# Patient Record
Sex: FEMALE | Race: BLACK OR AFRICAN AMERICAN | NOT HISPANIC OR LATINO | Employment: OTHER | URBAN - METROPOLITAN AREA
[De-identification: names, ages, dates, MRNs, and addresses within clinical notes are randomized per-mention and may not be internally consistent; named-entity substitution may affect disease eponyms.]

---

## 2024-09-05 ENCOUNTER — OFFICE VISIT (OUTPATIENT)
Dept: FAMILY MEDICINE | Facility: CLINIC | Age: 24
End: 2024-09-05
Payer: MEDICAID

## 2024-09-05 VITALS
BODY MASS INDEX: 33.64 KG/M2 | HEART RATE: 83 BPM | DIASTOLIC BLOOD PRESSURE: 86 MMHG | TEMPERATURE: 97.4 F | SYSTOLIC BLOOD PRESSURE: 126 MMHG | HEIGHT: 62 IN | WEIGHT: 182.8 LBS | OXYGEN SATURATION: 99 % | RESPIRATION RATE: 18 BRPM

## 2024-09-05 DIAGNOSIS — Z11.4 SCREENING FOR HIV (HUMAN IMMUNODEFICIENCY VIRUS): ICD-10-CM

## 2024-09-05 DIAGNOSIS — Z02.89 REFUGEE HEALTH EXAMINATION: Primary | ICD-10-CM

## 2024-09-05 DIAGNOSIS — Z11.59 NEED FOR HEPATITIS C SCREENING TEST: ICD-10-CM

## 2024-09-05 LAB
BASOPHILS # BLD AUTO: 0.1 10E3/UL (ref 0–0.2)
BASOPHILS NFR BLD AUTO: 1 %
EOSINOPHIL # BLD AUTO: 0.5 10E3/UL (ref 0–0.7)
EOSINOPHIL NFR BLD AUTO: 6 %
ERYTHROCYTE [DISTWIDTH] IN BLOOD BY AUTOMATED COUNT: 13.7 % (ref 10–15)
HCT VFR BLD AUTO: 41.9 % (ref 35–47)
HGB BLD-MCNC: 14 G/DL (ref 11.7–15.7)
IMM GRANULOCYTES # BLD: 0 10E3/UL
IMM GRANULOCYTES NFR BLD: 0 %
LYMPHOCYTES # BLD AUTO: 2.7 10E3/UL (ref 0.8–5.3)
LYMPHOCYTES NFR BLD AUTO: 33 %
MCH RBC QN AUTO: 26.6 PG (ref 26.5–33)
MCHC RBC AUTO-ENTMCNC: 33.4 G/DL (ref 31.5–36.5)
MCV RBC AUTO: 80 FL (ref 78–100)
MONOCYTES # BLD AUTO: 0.5 10E3/UL (ref 0–1.3)
MONOCYTES NFR BLD AUTO: 6 %
NEUTROPHILS # BLD AUTO: 4.3 10E3/UL (ref 1.6–8.3)
NEUTROPHILS NFR BLD AUTO: 53 %
NRBC # BLD AUTO: 0 10E3/UL
NRBC BLD AUTO-RTO: 0 /100
PLATELET # BLD AUTO: 323 10E3/UL (ref 150–450)
RBC # BLD AUTO: 5.26 10E6/UL (ref 3.8–5.2)
WBC # BLD AUTO: 8.2 10E3/UL (ref 4–11)

## 2024-09-05 PROCEDURE — 80053 COMPREHEN METABOLIC PANEL: CPT | Performed by: FAMILY MEDICINE

## 2024-09-05 PROCEDURE — 87591 N.GONORRHOEAE DNA AMP PROB: CPT | Performed by: FAMILY MEDICINE

## 2024-09-05 PROCEDURE — 87389 HIV-1 AG W/HIV-1&-2 AB AG IA: CPT | Performed by: FAMILY MEDICINE

## 2024-09-05 PROCEDURE — 86803 HEPATITIS C AB TEST: CPT | Performed by: FAMILY MEDICINE

## 2024-09-05 PROCEDURE — 86682 HELMINTH ANTIBODY: CPT | Mod: 90 | Performed by: FAMILY MEDICINE

## 2024-09-05 PROCEDURE — 36415 COLL VENOUS BLD VENIPUNCTURE: CPT | Performed by: FAMILY MEDICINE

## 2024-09-05 PROCEDURE — 86787 VARICELLA-ZOSTER ANTIBODY: CPT | Performed by: FAMILY MEDICINE

## 2024-09-05 PROCEDURE — 99385 PREV VISIT NEW AGE 18-39: CPT | Performed by: FAMILY MEDICINE

## 2024-09-05 PROCEDURE — 86481 TB AG RESPONSE T-CELL SUSP: CPT | Performed by: FAMILY MEDICINE

## 2024-09-05 PROCEDURE — 99000 SPECIMEN HANDLING OFFICE-LAB: CPT | Performed by: FAMILY MEDICINE

## 2024-09-05 PROCEDURE — 87491 CHLMYD TRACH DNA AMP PROBE: CPT | Performed by: FAMILY MEDICINE

## 2024-09-05 PROCEDURE — 85025 COMPLETE CBC W/AUTO DIFF WBC: CPT | Performed by: FAMILY MEDICINE

## 2024-09-05 NOTE — PROGRESS NOTES
New Patient Note-Refugee     HPI     Concerns today: No special concerns today.  An [employer]  was used for  this visit.     There is no problem list on file for this patient.      No past medical history on file.    Immunization History   Administered Date(s) Administered    HepB, Unspecified 12/05/2023, 01/09/2024, 06/19/2024    MMR 11/01/2023, 12/05/2023    Td (Adult), Adsorbed 12/05/2023, 01/09/2024     [facility] Refugee Immunization Guidelines     No family history on file.       Review of Systems     Review of Systems:  CONSTITUTIONAL: NEGATIVE for fever, chills, change in weight  INTEGUMENTARY/SKIN: NEGATIVE for worrisome rashes, moles or lesions  EYES: NEGATIVE for vision changes or irritation  ENT/MOUTH: NEGATIVE for ear, mouth and throat problems  RESP: NEGATIVE for significant cough or SOB  BREAST: NEGATIVE for masses, tenderness or discharge  CV: NEGATIVE for chest pain, palpitations or peripheral edema  GI: NEGATIVE for nausea, abdominal pain, heartburn, or change in bowel habits  : NEGATIVE for frequency, dysuria, or hematuria  MUSCULOSKELETAL: NEGATIVE for significant arthralgias or myalgia  NEURO: NEGATIVE for weakness, dizziness or paresthesias  ENDOCRINE: NEGATIVE for temperature intolerance, skin/hair changes  HEME/ALLERGY: NEGATIVE for bleeding problems  PSYCHIATRIC: NEGATIVE for changes in mood or affect       Social History     Social History     Tobacco Use    Smoking status: Never     Passive exposure: Never    Smokeless tobacco: Never   Substance Use Topics    Alcohol use: Not on file       Marital Status:Single  Who lives in your household? daughter    Country of Origin: [place]    Were you the victim of violence in your home country? No    If YES, offer the following and complete Refugee Mental Health Screen below:      Refugee Mental Health Screen:  [facility] Mental Health Screening Recommendations  (If any positive results recommend further evaluation)  1. In the past month, have you had  "many bad dreams or nightmares that remind you of things that happened in your country or refugee camp? No    2. In the past month, have you felt very sad? Yes    3. In the past month, have you been thinking too much about the past (even if you did not want to)? No    4. In the past month, have you avoided situations that remind you of the past? No  Prompt: Do you turn off the radio or TV if the programming is disturbing    5. Do any of these problems make it difficult to do what you need to do on a daily basis? No  Prompt: Are you able to take care of yourself and your family?         Sexual Health     Sexual concerns: none  STI History: Neg    For female patients:   Currently pregnant: No   Pregnancy History: No obstetric history on file.   LMP Patient's last menstrual period was 09/02/2024 (exact date).    Last Pap Smear Date: No results found for: \"PAP\"   Abnormal Pap History: None     Physical Exam     Vitals: /86 (BP Location: Right arm, Patient Position: Chair, Cuff Size: Adult Regular)   Pulse 83   Temp 97.4  F (36.3  C) (Temporal)   Resp 18   Ht 1.562 m (5' 1.5\")   Wt 82.9 kg (182 lb 12.8 oz)   LMP 09/02/2024 (Exact Date)   SpO2 99%   BMI 33.98 kg/m    BMI= Body mass index is 33.98 kg/m .     GENERAL: healthy, alert, well nourished, well hydrated, no distress  HENT: ear canals- normal; TMs- normal; Nose- normal; Mouth- no ulcers, no lesions  NECK: no tenderness, no adenopathy, no asymmetry, no masses, no stiffness; thyroid- normal to palpation  RESP: lungs clear to auscultation - no rales, no rhonchi, no wheezes  CV: regular rates and rhythm, normal S1 S2, no S3 or S4 and no murmur, no click or rub -  ABDOMEN: soft, no tenderness, no  hepatosplenomegaly, no masses, normal bowel sounds  - female: circumcision grade 1 - Cat Barclay PAC         Assessment and Plan      Sejal was seen today for refugee assessment.    Diagnoses and all orders for this visit:    Refugee health examination  -     " Quantiferon TB Gold Plus; Future  -     Comprehensive metabolic panel (BMP + Alb, Alk Phos, ALT, AST, Total. Bili, TP); Future  -     CBC with platelets and differential; Future  -     Strongyloides antibody IgG; Future  -     Schistosoma Antibody IgG; Future  -     Varicella Zoster Virus Antibody IgG; Future  -     Neisseria gonorrhoeae PCR; Future  -     Chlamydia trachomatis PCR; Future  -     Quantiferon TB Gold Plus  -     Comprehensive metabolic panel (BMP + Alb, Alk Phos, ALT, AST, Total. Bili, TP)  -     CBC with platelets and differential  -     Strongyloides antibody IgG  -     Schistosoma Antibody IgG  -     Varicella Zoster Virus Antibody IgG  -     Neisseria gonorrhoeae PCR  -     Chlamydia trachomatis PCR  -     Hepatitis B Surface Antibody; Future  -     Hepatitis B core antibody; Future  -     Hepatitis B surface antigen; Future  -     Hepatitis A antibody IgM; Future  -     Varicella Zoster Virus Antibody IgG; Future    Screening for HIV (human immunodeficiency virus)  -     HIV Antigen Antibody Combo; Future  -     HIV Antigen Antibody Combo    Need for hepatitis C screening test  -     Hepatitis C Screen Reflex to HCV RNA Quant and Genotype; Future  -     Hepatitis C Screen Reflex to HCV RNA Quant and Genotype        Options for treatment and follow-up care were reviewed with the patient . Sejal Ortega  engaged in the decision making process and verbalized understanding of the options discussed and agreed with the final plan.    Follow up 2 weeks    FELIPE ESCOBEDO DO

## 2024-09-06 LAB
ALBUMIN SERPL BCG-MCNC: 4.4 G/DL (ref 3.5–5.2)
ALP SERPL-CCNC: 102 U/L (ref 40–150)
ALT SERPL W P-5'-P-CCNC: 17 U/L (ref 0–50)
ANION GAP SERPL CALCULATED.3IONS-SCNC: 11 MMOL/L (ref 7–15)
AST SERPL W P-5'-P-CCNC: 21 U/L (ref 0–45)
BILIRUB SERPL-MCNC: 0.5 MG/DL
BUN SERPL-MCNC: 10.7 MG/DL (ref 6–20)
C TRACH DNA SPEC QL NAA+PROBE: NEGATIVE
CALCIUM SERPL-MCNC: 9.4 MG/DL (ref 8.8–10.4)
CHLORIDE SERPL-SCNC: 103 MMOL/L (ref 98–107)
CREAT SERPL-MCNC: 0.64 MG/DL (ref 0.51–0.95)
EGFRCR SERPLBLD CKD-EPI 2021: >90 ML/MIN/1.73M2
GLUCOSE SERPL-MCNC: 94 MG/DL (ref 70–99)
HCO3 SERPL-SCNC: 22 MMOL/L (ref 22–29)
HCV AB SERPL QL IA: NONREACTIVE
HIV 1+2 AB+HIV1 P24 AG SERPL QL IA: NONREACTIVE
N GONORRHOEA DNA SPEC QL NAA+PROBE: NEGATIVE
POTASSIUM SERPL-SCNC: 4.2 MMOL/L (ref 3.4–5.3)
PROT SERPL-MCNC: 7.3 G/DL (ref 6.4–8.3)
SODIUM SERPL-SCNC: 136 MMOL/L (ref 135–145)
VZV IGG SER QL IA: 786.4 INDEX
VZV IGG SER QL IA: POSITIVE

## 2024-09-07 LAB
GAMMA INTERFERON BACKGROUND BLD IA-ACNC: 0.04 IU/ML
M TB IFN-G BLD-IMP: NEGATIVE
M TB IFN-G CD4+ BCKGRND COR BLD-ACNC: 9.96 IU/ML
MITOGEN IGNF BCKGRD COR BLD-ACNC: 0 IU/ML
MITOGEN IGNF BCKGRD COR BLD-ACNC: 0 IU/ML
QUANTIFERON MITOGEN: 10 IU/ML
QUANTIFERON NIL TUBE: 0.04 IU/ML
QUANTIFERON TB1 TUBE: 0.04 IU/ML
QUANTIFERON TB2 TUBE: 0.04

## 2024-09-08 LAB
SCHISTOSOMA IGG SER IA-ACNC: 59 U
STRONGYLOIDES IGG SER IA-ACNC: 0.1 IV

## 2024-09-19 ENCOUNTER — OFFICE VISIT (OUTPATIENT)
Dept: FAMILY MEDICINE | Facility: CLINIC | Age: 24
End: 2024-09-19
Payer: MEDICAID

## 2024-09-19 VITALS
DIASTOLIC BLOOD PRESSURE: 71 MMHG | HEIGHT: 62 IN | OXYGEN SATURATION: 100 % | BODY MASS INDEX: 33.86 KG/M2 | HEART RATE: 82 BPM | SYSTOLIC BLOOD PRESSURE: 103 MMHG | WEIGHT: 184 LBS | RESPIRATION RATE: 16 BRPM | TEMPERATURE: 97.7 F

## 2024-09-19 DIAGNOSIS — Z02.89 REFUGEE HEALTH EXAMINATION: Primary | ICD-10-CM

## 2024-09-19 PROCEDURE — 99213 OFFICE O/P EST LOW 20 MIN: CPT | Performed by: FAMILY MEDICINE

## 2024-09-19 NOTE — PROGRESS NOTES
"  Assessment & Plan   Problem List Items Addressed This Visit    None  Visit Diagnoses       Refugee health examination    -  Primary           No positive findings, will submit paperwork to the immigration department  Reviewed findings with   Follow up PRN     BMI  Estimated body mass index is 34.2 kg/m  as calculated from the following:    Height as of this encounter: 1.562 m (5' 1.5\").    Weight as of this encounter: 83.5 kg (184 lb).             Manuel Post is a 23 year old, presenting for the following health issues:  Refgee Assessment (Visit #2)      9/19/2024    10:55 AM   Additional Questions   Roomed by Joyce FAY CMA         9/19/2024   Forms   Any forms needing to be completed Yes        HPI     Passed hearing/vision      Objective    /71 (BP Location: Right arm, Patient Position: Chair, Cuff Size: Adult Large)   Pulse 82   Temp 97.7  F (36.5  C) (Temporal)   Resp 16   Ht 1.562 m (5' 1.5\")   Wt 83.5 kg (184 lb)   LMP 09/02/2024 (Exact Date)   SpO2 100%   BMI 34.20 kg/m    Body mass index is 34.2 kg/m .  Physical Exam   Gen NAD      Office Visit on 09/05/2024   Component Date Value Ref Range Status    HIV Antigen Antibody Combo 09/05/2024 Nonreactive  Nonreactive Final    Negative HIV-1 p24 antigen and HIV-1/2 antibody screening test results usually indicate the absence of HIV-1 and HIV-2 infection. However, such negative results do not rule-out acute HIV infection.  If acute HIV-1 or HIV-2 infection is suspected, detection of HIV-1 or HIV-2 RNA  is recommended.     Hepatitis C Antibody 09/05/2024 Nonreactive  Nonreactive Final    A nonreactive screening test result does not exclude the possibility of exposure to or infection with HCV. Nonreactive screening test results in individuals with prior exposure to HCV may be due to antibody levels below the limit of detection of this assay or lack of reactivity to the HCV antigens used in this assay. Patients with recent HCV " infections (<3 months from time of exposure) may have false-negative HCV antibody results due to the time needed for seroconversion (average of 8 to 9 weeks).    Sodium 09/05/2024 136  135 - 145 mmol/L Final    Potassium 09/05/2024 4.2  3.4 - 5.3 mmol/L Final    Carbon Dioxide (CO2) 09/05/2024 22  22 - 29 mmol/L Final    Anion Gap 09/05/2024 11  7 - 15 mmol/L Final    Urea Nitrogen 09/05/2024 10.7  6.0 - 20.0 mg/dL Final    Creatinine 09/05/2024 0.64  0.51 - 0.95 mg/dL Final    GFR Estimate 09/05/2024 >90  >60 mL/min/1.73m2 Final    eGFR calculated using 2021 CKD-EPI equation.    Calcium 09/05/2024 9.4  8.8 - 10.4 mg/dL Final    Reference intervals for this test were updated on 7/16/2024 to reflect our healthy population more accurately. There may be differences in the flagging of prior results with similar values performed with this method. Those prior results can be interpreted in the context of the updated reference intervals.    Chloride 09/05/2024 103  98 - 107 mmol/L Final    Glucose 09/05/2024 94  70 - 99 mg/dL Final    Alkaline Phosphatase 09/05/2024 102  40 - 150 U/L Final    AST 09/05/2024 21  0 - 45 U/L Final    ALT 09/05/2024 17  0 - 50 U/L Final    Protein Total 09/05/2024 7.3  6.4 - 8.3 g/dL Final    Albumin 09/05/2024 4.4  3.5 - 5.2 g/dL Final    Bilirubin Total 09/05/2024 0.5  <=1.2 mg/dL Final    Strongyloides Lauren IgG 09/05/2024 0.1  <=0.9 IV Final    Comment: INTERPRETIVE INFORMATION: Strongyloides Ab, IgG by [facility]ERLEY      0.9 IV or less....... Negative - No significant                          level of Strongyloides IgG                          antibody detected.       1.0 IV................Equivocal - The Strongyloides IgG                           antibody result is borderline and                           therefore inconclusive. Recommend                           retesting the patient in 2-4 weeks,                          if clinically indicated.      1.1 IV or greater ... Positive - IgG  antibodies to                          Strongyloides detected, which                          may suggest current or past                          infection.    False-positive results may occur with prior exposure to   other helminth infections. Testing low-prevalence   populations may also result in false-positive results.  Performed By: [facility]  [address] [place] [address]  : Cisco Beck MD, PhD  CLIA                            Number: [id number]    Schistosoma Antibody IgG 09/05/2024 59 (H)  <=8 U Final      Positive - IgG antibodies to Schistosoma detected, which   may suggest current or past infection.   This test cannot distinguish between current or resolved,   past infection. Serologic cross-reactivity can occur in   individuals with other helminth infections. When serum   antibodies are positive, the presence of Schistosoma ova in   stool or urine should be evaluated by microscopy. This test   should not be used to monitor for cure because patients   remain seropositive after successful treatment and/or   infection clearance.  Performed By: [facility]  [address] [place] [id number]  : Cisco Beck MD, PhD  CLIA Number: [id number]    VZV Lauren IgG Instrument Value 09/05/2024 786.4  <135.0 Index Final    Varicella Zoster Antibody IgG 09/05/2024 Positive   Final    Suggests previous exposure or immunization and probable immunity.    Neisseria gonorrhoeae 09/05/2024 Negative  Negative Final    Negative for N. gonorrhoeae rRNA by transcription mediated amplification. A negative result by transcription mediated amplification does not preclude the presence of C. trachomatis infection because results are dependent on proper and adequate collection, absence of inhibitors and sufficient rRNA to be detected.    Chlamydia trachomatis 09/05/2024 Negative  Negative Final    A negative result by transcription mediated  amplification does not preclude the presence of C. trachomatis infection because results are dependent on proper and adequate collection, absence of inhibitors and sufficient rRNA to be detected.    Quantiferon Nil Tube 09/05/2024 0.04  IU/mL Final    Quantiferon TB1 Tube 09/05/2024 0.04  IU/mL Final    Quantiferon TB2 Tube 09/05/2024 0.04   Final    Quantiferon Mitogen 09/05/2024 10.00  IU/mL Final    WBC Count 09/05/2024 8.2  4.0 - 11.0 10e3/uL Final    RBC Count 09/05/2024 5.26 (H)  3.80 - 5.20 10e6/uL Final    Hemoglobin 09/05/2024 14.0  11.7 - 15.7 g/dL Final    Hematocrit 09/05/2024 41.9  35.0 - 47.0 % Final    MCV 09/05/2024 80  78 - 100 fL Final    MCH 09/05/2024 26.6  26.5 - 33.0 pg Final    MCHC 09/05/2024 33.4  31.5 - 36.5 g/dL Final    RDW 09/05/2024 13.7  10.0 - 15.0 % Final    Platelet Count 09/05/2024 323  150 - 450 10e3/uL Final    % Neutrophils 09/05/2024 53  % Final    % Lymphocytes 09/05/2024 33  % Final    % Monocytes 09/05/2024 6  % Final    % Eosinophils 09/05/2024 6  % Final    % Basophils 09/05/2024 1  % Final    % Immature Granulocytes 09/05/2024 0  % Final    NRBCs per 100 WBC 09/05/2024 0  <1 /100 Final    Absolute Neutrophils 09/05/2024 4.3  1.6 - 8.3 10e3/uL Final    Absolute Lymphocytes 09/05/2024 2.7  0.8 - 5.3 10e3/uL Final    Absolute Monocytes 09/05/2024 0.5  0.0 - 1.3 10e3/uL Final    Absolute Eosinophils 09/05/2024 0.5  0.0 - 0.7 10e3/uL Final    Absolute Basophils 09/05/2024 0.1  0.0 - 0.2 10e3/uL Final    Absolute Immature Granulocytes 09/05/2024 0.0  <=0.4 10e3/uL Final    Absolute NRBCs 09/05/2024 0.0  10e3/uL Final    Quantiferon-TB Gold Plus 09/05/2024 Negative  Negative Final    No interferon gamma response to M.tuberculosis antigens was detected. Infection with M.tuberculosis is unlikely, however a single negative result does not exclude infection. In patients at high risk for infection, a second test should be considered in accordance with the 2017 ATS/IDSA/CDC Clinical  Pract  ice Guidelines for Diagnosis of Tuberculosis in Adults and Children     TB1 Ag minus Nil Value 09/05/2024 0.00  IU/mL Final    TB2 Ag minus Nil Value 09/05/2024 0.00  IU/mL Final    Mitogen minus Nil Result 09/05/2024 9.96  IU/mL Final    Nil Result 09/05/2024 0.04  IU/mL Final           Signed Electronically by: FELIPE ESCOBEDO DO

## 2024-09-19 NOTE — PROGRESS NOTES
"New Patient Note-Refugee     HPI     Concerns today: No special concerns today.  An [employer]  was used for  this visit.     There is no problem list on file for this patient.      No past medical history on file.    Immunization History   Administered Date(s) Administered    HepB, Unspecified 12/05/2023, 01/09/2024, 06/19/2024    MMR 11/01/2023, 12/05/2023    Td (Adult), Adsorbed 12/05/2023, 01/09/2024     [facility] Refugee Immunization Guidelines     No family history on file.       Review of Systems     Review of Systems:  {ROS NORMAL:704489::\"CONSTITUTIONAL: NEGATIVE for fever, chills, change in weight\",\"INTEGUMENTARY/SKIN: NEGATIVE for worrisome rashes, moles or lesions\",\"EYES: NEGATIVE for vision changes or irritation\",\"ENT/MOUTH: NEGATIVE for ear, mouth and throat problems\",\"RESP: NEGATIVE for significant cough or SOB\",\"BREAST: NEGATIVE for masses, tenderness or discharge\",\"CV: NEGATIVE for chest pain, palpitations or peripheral edema\",\"GI: NEGATIVE for nausea, abdominal pain, heartburn, or change in bowel habits\",\": NEGATIVE for frequency, dysuria, or hematuria\",\"MUSCULOSKELETAL: NEGATIVE for significant arthralgias or myalgia\",\"NEURO: NEGATIVE for weakness, dizziness or paresthesias\",\"ENDOCRINE: NEGATIVE for temperature intolerance, skin/hair changes\",\"HEME/ALLERGY: NEGATIVE for bleeding problems\",\"PSYCHIATRIC: NEGATIVE for changes in mood or affect\"}       Social History     Social History     Tobacco Use    Smoking status: Never     Passive exposure: Never    Smokeless tobacco: Never   Substance Use Topics    Alcohol use: Not on file       Marital Status:{MARITAL STATUS:086321}  Who lives in your household? ***    Country of Origin: ***    Were you the victim of violence in your home country? {YES / NO:053932::\"Yes\"}    If YES, offer the following and complete Refugee Mental Health Screen below:    Recount the experience briefly now: {YES / NO:584938::\"Yes\"}    Visit with a psychologist or therapist: " "{YES / NO:615474::\"Yes\"}     Not recounting at this time: {YES / NO:733740::\"Yes\"}    Refugee Mental Health Screen:  [facility] Mental Health Screening Recommendations  (If any positive results recommend further evaluation)  1. In the past month, have you had many bad dreams or nightmares that remind you of things that happened in your country or refugee camp? {YES / NO:331561::\"Yes\"}    2. In the past month, have you felt very sad? {YES / NO:829138::\"Yes\"}    3. In the past month, have you been thinking too much about the past (even if you did not want to)? {YES / NO:664457::\"Yes\"}    4. In the past month, have you avoided situations that remind you of the past? {YES / NO:425451::\"Yes\"}  Prompt: Do you turn off the radio or TV if the programming is disturbing    5. Do any of these problems make it difficult to do what you need to do on a daily basis? {YES / NO:539758::\"Yes\"}  Prompt: Are you able to take care of yourself and your family?         Sexual Health     Sexual concerns: ***  STI History: {NEG/POS-NEG DEFAULT:854053}    For female patients:   Currently pregnant: {YES / NO:047966::\"Yes\"}   Pregnancy History: No obstetric history on file.   LMP Patient's last menstrual period was 09/02/2024 (exact date). {[facility] LMP:895135}   Last Pap Smear Date: No results found for: \"PAP\"   Abnormal Pap History: {[facility] ABNORMAL PAP:968330}     Physical Exam     Vitals: /71 (BP Location: Right arm, Patient Position: Chair, Cuff Size: Adult Large)   Pulse 82   Temp 97.7  F (36.5  C) (Temporal)   Resp 16   Ht 1.562 m (5' 1.5\")   Wt 83.5 kg (184 lb)   LMP 09/02/2024 (Exact Date)   SpO2 100%   BMI 34.20 kg/m    BMI= Body mass index is 34.2 kg/m .     {EXAM:539918}     Screenings and Results     Tuberculosis Screening:  [facility] Tuberculosis Flowchart  [facility] Tuberculosis Guidelines  TB class from [place] exam: ***   TST: {TB TEST:423168}   IGRA: {NEGATIVE/POSITIVE:781789}   CXR: {NORMAL:207026}   Diagnosis and treatment status: " "***    Heb B Screening:  [facility] Hepatitis B Guidelines   Anti-HBs: {NEGATIVE/POSITIVE:599285}   HBsAg: {NEGATIVE/POSITIVE:950921}   Anti-HBc: {NEGATIVE/POSITIVE:473056}   If positive HBsAg, recommend screening and vaccinating household contacts.    Malaria Screening:  [facility] Malaria Guidelines   [place] refugee:     Received pre-arrival presumptive treatment? {YES / NO:082106::\"Yes\"}    If NO, needs presumptive treatment at this time.   -or- Refugees from other endemic areas:     Peripheral smear (only with signs/symptoms): ***    Sexually Transmitted Diseases Screening:  [facility] STD Guidelines   HIV:  {NEGATIVE/POSITIVE:916219}      Confirmatory: {NEGATIVE/POSITIVE:041364}     Referral for therapy: {Done/Not Done:957579794}  Syphilis:  {NEGATIVE/POSITIVE:506636}      Confirmatory: {NEGATIVE/POSITIVE:663700}     Treated: {YES / NO:953403::\"Yes\"}   Gonorrhea: {NEGATIVE/POSITIVE:280875}      Treated: {YES / NO:181837::\"Yes\"}   Chlamydia: {NEGATIVE/POSITIVE:055000}      Treated: {YES / NO:957808::\"Yes\"}    Intestinal Parasite Screening:  [facility] Parasite Screening Flowsheet  [facility] Parasite Guidelines   CBC with eosinophilia:  {YES / NO:287181::\"Yes\"}   Schistosoma serology:  {NEGATIVE/POSITIVE:276136}        Treated: {YES / NO:305214::\"Yes\"}    Strongyloides serology:  {NEGATIVE/POSITIVE:217932}        Treated: {YES / NO:105971::\"Yes\"}   Stool O&P   {PARASITE SCREENIN}    Lead Screen (<17 years old only):  [facility] Lead Guidelines   Venous Blood Lead Level: ***    Blood Glucose: ***  Hemoglobin: ***  Hematocrit: ***  Vitamin B12: ***    Hearing Screen: {NORMAL:596361}  Vision Screen: {NORMAL:780097}     Assessment and Plan      Sejal was seen today for refgee assessment.    Diagnoses and all orders for this visit:    Cervical cancer screening        Options for treatment and follow-up care were reviewed with the patient . Sejal Ortega  engaged in the decision making process and verbalized understanding of the options " discussed and agreed with the final plan.    Joyce Kat, CMA

## 2024-10-02 ENCOUNTER — TELEPHONE (OUTPATIENT)
Dept: FAMILY MEDICINE | Facility: CLINIC | Age: 24
End: 2024-10-02
Payer: COMMERCIAL

## 2024-10-02 DIAGNOSIS — B65.9 SCHISTOSOMIASIS: Primary | ICD-10-CM

## 2024-10-02 RX ORDER — PRAZIQUANTEL 600 MG/1
600 TABLET, FILM COATED ORAL 3 TIMES DAILY
Qty: 6 TABLET | Refills: 0 | Status: SHIPPED | OUTPATIENT
Start: 2024-10-02 | End: 2024-10-04

## 2024-10-02 NOTE — TELEPHONE ENCOUNTER
Spoke with pt and notified. She did not want to schedule lab at this time and stated she will call back to schedule. Will  meds this evening.     Thanks,  JENNIFER Esposito  [facility]

## 2024-10-02 NOTE — TELEPHONE ENCOUNTER
Please call patient:  Positive parasite in a blood test, please take medicine sent to pharmacy ([facility]) 3x/day for 2 days    Also, please redraw blood at your convenience as soon as possible    Dr Nguyễn

## 2024-10-07 ENCOUNTER — LAB (OUTPATIENT)
Dept: LAB | Facility: CLINIC | Age: 24
End: 2024-10-07
Payer: COMMERCIAL

## 2024-10-07 DIAGNOSIS — Z02.89 REFUGEE HEALTH EXAMINATION: ICD-10-CM

## 2024-10-07 LAB
MEV IGG SER IA-ACNC: 224 AU/ML
MEV IGG SER IA-ACNC: POSITIVE
MUMPS ANTIBODY IGG INSTRUMENT VALUE: 175 AU/ML
MUV IGG SER QL IA: POSITIVE
RUBV IGG SERPL QL IA: 19.9 INDEX
RUBV IGG SERPL QL IA: POSITIVE
T PALLIDUM AB SER QL: NONREACTIVE

## 2024-10-07 PROCEDURE — 36415 COLL VENOUS BLD VENIPUNCTURE: CPT

## 2024-10-07 PROCEDURE — 86762 RUBELLA ANTIBODY: CPT

## 2024-10-07 PROCEDURE — 86765 RUBEOLA ANTIBODY: CPT

## 2024-10-07 PROCEDURE — 86735 MUMPS ANTIBODY: CPT

## 2024-10-07 PROCEDURE — 86780 TREPONEMA PALLIDUM: CPT

## 2024-10-07 NOTE — LETTER
October 8, 2024      Sejal Ortega  [address]   [place] [address]        Dear ,    We are writing to inform you of your test results.    You are immune to MMR (measles, mumps and rubella). Your syphilis test is normal/negative.     Resulted Orders   Treponema Abs w Reflex to RPR and Titer   Result Value Ref Range    Treponema Antibody Total Nonreactive Nonreactive   Rubella Antibody IgG   Result Value Ref Range    Rubella Lauren IgG Instrument Value 19.90 <0.90 Index    Rubella Antibody IgG Positive       Comment:      Suggests previous exposure or immunization and probable immunity.   Mumps Immune Status, IgG   Result Value Ref Range    Mumps Lauren IgG Instrument Value 175.0 <9.0 AU/mL    Mumps Antibody IgG Positive       Comment:      Suggests previous exposure or immunization and probable immunity.   Rubeola Antibody IgG   Result Value Ref Range    Rubeola (Measles) Lauren IgG Instrument Value 224.0 <13.5 AU/mL    Rubeola (Measles) Antibody IgG Positive       Comment:      Suggests previous exposure or immunization and probable immunity.     If you have any questions or concerns, please call the clinic at the number listed above.     Sincerely,      Kelley Lobato MD/kimberly

## 2024-10-08 NOTE — RESULT ENCOUNTER NOTE
You are immune to MMR (measles, mumps and rubella).     Your syphilis test is normal/negative.     Kelley Lobato MD

## 2025-03-06 ENCOUNTER — TELEPHONE (OUTPATIENT)
Dept: FAMILY MEDICINE | Facility: CLINIC | Age: 25
End: 2025-03-06
Payer: COMMERCIAL

## 2025-03-06 NOTE — TELEPHONE ENCOUNTER
Patient Quality Outreach    Patient is due for the following:   Cervical Cancer Screening - PAP Needed      Topic Date Due    HPV Vaccine (1 - 3-dose series) Never done    Diptheria Tetanus Pertussis (DTAP/TDAP/TD) Vaccine (1 - Tdap) 01/10/2024    Flu Vaccine (1) Never done    COVID-19 Vaccine (1 - 2024-25 season) Never done       Action(s) Taken:   Schedule a office visit for Pap Smear    Type of outreach:    Sent letter.    Questions for provider review:    None           Joyce Kat CMA  Chart routed to Care Team.

## 2025-03-06 NOTE — LETTER
March 6, 2025      Sejal Ortega  [address]   [place] [address]    Your team at [facility] cares about your health. We have reviewed your chart and based on our findings; we are making the following recommendations to better manage your health.     You are in particular need of attention regarding the following:     Schedule a primary care office visit with your provider for a Pap Smear to screen for Cervical Cancer.    If you have already completed these items, please contact the clinic via phone or   [employer] plchart so your care team can review and update your records. Thank you for   choosing [facility] for your healthcare needs. For any questions,   concerns, or to schedule an appointment please contact our clinic.    Healthy Regards,      Your [facility] Care Team            Electronically signed

## 2025-06-04 ENCOUNTER — TELEPHONE (OUTPATIENT)
Dept: FAMILY MEDICINE | Facility: CLINIC | Age: 25
End: 2025-06-04
Payer: COMMERCIAL

## 2025-06-04 NOTE — LETTER
June 4, 2025    Sejal Ortega    [address]   [facility] [address]    Hello,     Your care team at [facility] values your health and well-being. After reviewing your chart, we have identified recommendation(s) to help you better manage your health.    It's time for a follow-up visit to manage your Immunizations, Cervical Cancer Screening - Pap smear. Please call the clinic at [phone] to make an appointment.    If you recently had or are having any of these services soon, please contact the clinic via phone or [email or web address] so that your care team can update your records.    We look forward to seeing you at your upcoming visit.    If you have any questions or concerns, please contact our clinic. Thank you for continuing to trust us with your care.    Sincerely,    Your [facility] Care Team            Electronically signed

## 2025-06-04 NOTE — TELEPHONE ENCOUNTER
Patient Quality Outreach    Patient is due for the following:   Cervical Cancer Screening - PAP Needed      Topic Date Due    HPV Vaccine (1 - 3-dose series) Never done    Diptheria Tetanus Pertussis (DTAP/TDAP/TD) Vaccine (1 - Tdap) 01/10/2024    COVID-19 Vaccine (1 - 2024-25 season) Never done       Action(s) Taken:   Schedule a office visit for Cervical Cancer Screening    Type of outreach:    Sent letter.    Questions for provider review:    None         Joyce Kat CMA  Chart routed to Care Team.